# Patient Record
Sex: MALE | Race: WHITE | Employment: FULL TIME | URBAN - METROPOLITAN AREA
[De-identification: names, ages, dates, MRNs, and addresses within clinical notes are randomized per-mention and may not be internally consistent; named-entity substitution may affect disease eponyms.]

---

## 2019-06-28 ENCOUNTER — OFFICE VISIT (OUTPATIENT)
Dept: URGENT CARE | Age: 34
End: 2019-06-28

## 2019-06-28 VITALS
HEART RATE: 77 BPM | BODY MASS INDEX: 33.27 KG/M2 | WEIGHT: 219.5 LBS | TEMPERATURE: 98 F | DIASTOLIC BLOOD PRESSURE: 85 MMHG | HEIGHT: 68 IN | RESPIRATION RATE: 20 BRPM | SYSTOLIC BLOOD PRESSURE: 138 MMHG | OXYGEN SATURATION: 98 %

## 2019-06-28 DIAGNOSIS — S46.911A SHOULDER STRAIN, RIGHT, INITIAL ENCOUNTER: ICD-10-CM

## 2019-06-28 DIAGNOSIS — M25.511 ACUTE PAIN OF RIGHT SHOULDER: Primary | ICD-10-CM

## 2019-06-28 DIAGNOSIS — M19.011 OSTEOARTHRITIS OF RIGHT SHOULDER, UNSPECIFIED OSTEOARTHRITIS TYPE: ICD-10-CM

## 2019-06-28 NOTE — PATIENT INSTRUCTIONS
Rest and seek medical care for increased problems, any questions or concern including but not limited to the ones discussed with you here today. Strain or Sprain: After Your Visit to the Emergency Room  Your Care Instructions  You were seen in the emergency room because you stretched or tore a muscle or ligament. A strain means that you pulled or tore a muscle. A sprain means that you stretched or tore a ligament that holds bones together. The doctor may have put a brace, splint, or elastic bandage on your injured limb to keep it from moving. Or you may have been given crutches. Rest and other home care can help the muscle or ligament heal. Be sure to talk to your doctor if you continue to have problems. Even though you have been released from the emergency room, you still need to watch for any problems. The doctor carefully checked you. But sometimes problems can develop later. If you have new symptoms, or if your symptoms do not get better, return to the emergency room or call your doctor right away. A visit to the emergency room is only one step in your treatment. Even if you feel better, you still need to do what your doctor recommends, such as going to all suggested follow-up appointments and taking medicines exactly as directed. This will help you recover and help prevent future problems. How can you care for yourself at home? · Rest the strained or sprained arm or leg. Do not put weight on it for a day or two. If your doctor tells you to, use crutches or a sling to rest a sore limb. · Take pain medicines exactly as directed. ¨ If the doctor gave you a prescription medicine for pain, take it as prescribed. ¨ If you are not taking a prescription pain medicine, ask your doctor if you can take an over-the-counter medicine. · Put ice or a cold pack on the sore area for 10 to 20 minutes at a time. Try to do this every 1 to 2 hours for the next 3 days (when you are awake) or until the swelling goes down. Put a thin cloth between the ice and your skin. · Prop up the arm or leg on a pillow when you ice it or anytime you sit or lie down. Try to keep it above the level of your heart. This will help reduce swelling. · Do not do anything that makes the pain worse. When should you call for help? Return to the emergency room now if:  · You have new severe pain. · The injured area is numb, tingling, cool, pale, or changes color. · You cannot move the injured area. Call your doctor today if:  · You have pain and swelling that gets worse. · You are not getting better as expected. Where can you learn more? Go to [employer]  Enter C197 in the search box to learn more about \"Strain or Sprain: After Your Visit to the Emergency Room. \"   © 4450-2886 Healthwise, Incorporated. Care instructions adapted under license by [facility] (which disclaims liability or warranty for this information). This care instruction is for use with your licensed healthcare professional. If you have questions about a medical condition or this instruction, always ask your healthcare professional. [place] [address] any warranty or liability for your use of this information. Content Version: 9.4.92662; Last Revised: November 30, 2010               Shoulder Arthritis: Exercises  Your Care Instructions  Here are some examples of typical rehabilitation exercises for your condition. Start each exercise slowly. Ease off the exercise if you start to have pain. Your doctor or physical therapist will tell you when you can start these exercises and which ones will work best for you. How to do the exercises  Shoulder flexion (lying down)    1. Lie on your back, holding a wand with both hands. Your palms should face down as you hold the wand. 2. Keeping your elbows straight, slowly raise your arms over your head.  Raise them until you feel a stretch in your shoulders, upper back, and chest.  3. Hold for 15 to 30 seconds. 4. Repeat 2 to 4 times. Shoulder rotation (lying down)    1. Lie on your back. Hold a wand with both hands with your elbows bent and palms up. 2. Keep your elbows close to your body, and move the wand across your body toward the sore arm. 3. Hold for 8 to 12 seconds. 4. Repeat 2 to 4 times. Shoulder internal rotation with towel    1. Hold a towel above and behind your head with the arm that is not sore. 2. With your sore arm, reach behind your back and grasp the towel. 3. With the arm above your head, pull the towel upward. Do this until you feel a stretch on the front and outside of your sore shoulder. 4. Hold 15 to 30 seconds. 5. Repeat 2 to 4 times. Shoulder blade squeeze    1. Stand with your arms at your sides, and squeeze your shoulder blades together. Do not raise your shoulders up as you squeeze. 2. Hold 6 seconds. 3. Repeat 8 to 12 times. Resisted rows    1. Put the band around a solid object at about waist level. (A bedpost will work well.) Each hand should hold an end of the band. 2. With your elbows at your sides and bent to 90 degrees, pull the band back. Your shoulder blades should move toward each other. Return to the starting position. 3. Repeat 8 to 12 times. External rotator strengthening exercise    1. Start by tying a piece of elastic exercise material to a doorknob. You can use surgical tubing or Thera-Band. (You may also hold one end of the band in each hand.)  2. Stand or sit with your shoulder relaxed and your elbow bent 90 degrees. Your upper arm should rest comfortably against your side. Squeeze a rolled towel between your elbow and your body for comfort. This will help keep your arm at your side. 3. Hold one end of the elastic band with the hand of the painful arm. 4. Start with your forearm across your belly. Slowly rotate the forearm out away from your body.  Keep your elbow and upper arm tucked against the towel roll or the side of your body until you begin to feel tightness in your shoulder. Slowly move your arm back to where you started. 5. Repeat 8 to 12 times. Internal rotator strengthening exercise    1. Start by tying a piece of elastic exercise material to a doorknob. You can use surgical tubing or Thera-Band. 2. Stand or sit with your shoulder relaxed and your elbow bent 90 degrees. Your upper arm should rest comfortably against your side. Squeeze a rolled towel between your elbow and your body for comfort. This will help keep your arm at your side. 3. Hold one end of the elastic band in the hand of the painful arm. 4. Slowly rotate your forearm toward your body until it touches your belly. Slowly move it back to where you started. 5. Keep your elbow and upper arm firmly tucked against the towel roll or at your side. 6. Repeat 8 to 12 times. Pendulum swing    1. Hold on to a table or the back of a chair with your good arm. Then bend forward a little and let your sore arm hang straight down. This exercise does not use the arm muscles. Rather, use your legs and your hips to create movement that makes your arm swing freely. 2. Use the movement from your hips and legs to guide the slightly swinging arm back and forth like a pendulum (or elephant trunk). Then guide it in circles that start small (about the size of a dinner plate). Make the circles a bit larger each day, as your pain allows. 3. Do this exercise for 5 minutes, 5 to 7 times each day. 4. As you have less pain, try bending over a little farther to do this exercise. This will increase the amount of movement at your shoulder. Follow-up care is a key part of your treatment and safety. Be sure to make and go to all appointments, and call your doctor if you are having problems. It's also a good idea to know your test results and keep a list of the medicines you take. Where can you learn more? Go to http://nevin-onesimo.info/.   Enter H562 in the search box to learn more about \"Shoulder Arthritis: Exercises. \"  Current as of: September 20, 2018  Content Version: 11.9  © 9096-4951 [employer]. Care instructions adapted under license by [employer] (which disclaims liability or warranty for this information). If you have questions about a medical condition or this instruction, always ask your healthcare professional. [address] any warranty or liability for your use of this information.

## 2019-06-28 NOTE — PROGRESS NOTES
About 6 weeks of pain with ROM in his rt shoulder after lifting a railroad tie. No other complaints. No previous injuries. Rt hand dominant    The history is provided by the patient. Shoulder Pain   This is a new problem. The current episode started more than 1 week ago. The problem occurs constantly. The problem has not changed since onset. Pertinent negatives include no chest pain, no abdominal pain, no headaches and no shortness of breath. Exacerbated by: certain positions. The symptoms are relieved by rest. He has tried nothing for the symptoms. History reviewed. No pertinent past medical history. Past Surgical History:   Procedure Laterality Date    HX WISDOM TEETH EXTRACTION           Family History   Problem Relation Age of Onset    Cancer Mother     Diabetes Father     Cancer Father     Hypertension Father     Elevated Lipids Father         Social History     Socioeconomic History    Marital status: SINGLE     Spouse name: Not on file    Number of children: Not on file    Years of education: Not on file    Highest education level: Not on file   Occupational History    Not on file   Social Needs    Financial resource strain: Not on file    Food insecurity:     Worry: Not on file     Inability: Not on file    Transportation needs:     Medical: Not on file     Non-medical: Not on file   Tobacco Use    Smoking status: Never Smoker    Smokeless tobacco: Never Used   Substance and Sexual Activity    Alcohol use:  Yes     Alcohol/week: 1.8 oz     Types: 1 Glasses of wine, 1 Cans of beer, 1 Shots of liquor per week    Drug use: No    Sexual activity: Yes     Partners: Female   Lifestyle    Physical activity:     Days per week: Not on file     Minutes per session: Not on file    Stress: Not on file   Relationships    Social connections:     Talks on phone: Not on file     Gets together: Not on file     Attends Restoration service: Not on file     Active member of club or organization: Not on file     Attends meetings of clubs or organizations: Not on file     Relationship status: Not on file    Intimate partner violence:     Fear of current or ex partner: Not on file     Emotionally abused: Not on file     Physically abused: Not on file     Forced sexual activity: Not on file   Other Topics Concern    Not on file   Social History Narrative    Not on file                ALLERGIES: Patient has no known allergies. Review of Systems   Constitutional: Negative. Respiratory: Negative. Negative for shortness of breath. Cardiovascular: Negative for chest pain. Gastrointestinal: Negative for abdominal pain. Musculoskeletal: Positive for arthralgias (rt shoulder pain with ROM). Negative for joint swelling. Skin: Negative. Neurological: Negative for weakness, numbness and headaches. Vitals:    06/28/19 0819   BP: 138/85   Pulse: 77   Resp: 20   Temp: 98 °F (36.7 °C)   SpO2: 98%   Weight: 219 lb 8 oz (99.6 kg)   Height: 5' 8\" (1.727 m)       Physical Exam   Constitutional: He is oriented to person, place, and time. He appears well-developed and well-nourished. HENT:   Head: Normocephalic and atraumatic. Mouth/Throat: Oropharynx is clear and moist. No oropharyngeal exudate. Eyes: Conjunctivae and EOM are normal. Right eye exhibits no discharge. Left eye exhibits no discharge. No scleral icterus. Neck: Normal range of motion. Neck supple. No tracheal deviation present. No thyromegaly present. Cardiovascular: Normal rate, regular rhythm and normal heart sounds. No murmur heard. Pulmonary/Chest: Effort normal and breath sounds normal. No respiratory distress. Musculoskeletal: Normal range of motion. He exhibits tenderness. He exhibits no edema. Pain in the [facility] with extension and external rotation. No swelling No crepitance. FROM with some tenderness noted above. NVI distally No rashes    Lymphadenopathy:     He has no cervical adenopathy.    Neurological: He is alert and oriented to person, place, and time. No cranial nerve deficit. Coordination normal.   Skin: Skin is warm. No rash noted. No erythema. Psychiatric: He has a normal mood and affect. His behavior is normal. Judgment and thought content normal.   Nursing note and vitals reviewed. MDM    ICD-10-CM ICD-9-CM    1. Acute pain of right shoulder M25.511 719.41 XR SHOULDER RT AP/LAT MIN 2 V   2. Shoulder strain, right, initial encounter S46.911A 840.9    3. Osteoarthritis of right shoulder, unspecified osteoarthritis type M19.011 715.91      No orders of the defined types were placed in this encounter. The patients condition was discussed with the patient and they understand. The patient is to follow up with primary care doctor ,If signs and symptoms become worse the pt is to go to the ER. The patient is to take medications as prescribed.                Procedures

## 2019-08-29 ENCOUNTER — OFFICE VISIT (OUTPATIENT)
Dept: FAMILY MEDICINE CLINIC | Age: 34
End: 2019-08-29

## 2019-08-29 VITALS
HEART RATE: 74 BPM | OXYGEN SATURATION: 99 % | HEIGHT: 68 IN | DIASTOLIC BLOOD PRESSURE: 80 MMHG | SYSTOLIC BLOOD PRESSURE: 130 MMHG | WEIGHT: 214.4 LBS | BODY MASS INDEX: 32.49 KG/M2 | TEMPERATURE: 98.3 F | RESPIRATION RATE: 16 BRPM

## 2019-08-29 DIAGNOSIS — R06.83 SNORING: ICD-10-CM

## 2019-08-29 DIAGNOSIS — S46.811S: ICD-10-CM

## 2019-08-29 DIAGNOSIS — E78.1 HYPERTRIGLYCERIDEMIA: ICD-10-CM

## 2019-08-29 DIAGNOSIS — Z23 ENCOUNTER FOR IMMUNIZATION: ICD-10-CM

## 2019-08-29 DIAGNOSIS — E66.9 OBESITY (BMI 30-39.9): ICD-10-CM

## 2019-08-29 DIAGNOSIS — M54.50 LUMBAR BACK PAIN: ICD-10-CM

## 2019-08-29 DIAGNOSIS — Z00.00 ROUTINE GENERAL MEDICAL EXAMINATION AT A HEALTH CARE FACILITY: Primary | ICD-10-CM

## 2019-08-29 RX ORDER — FLUTICASONE PROPIONATE 50 MCG
2 SPRAY, SUSPENSION (ML) NASAL DAILY
Qty: 1 BOTTLE | Refills: 5 | Status: SHIPPED | OUTPATIENT
Start: 2019-08-29 | End: 2022-07-07

## 2019-08-29 NOTE — PATIENT INSTRUCTIONS
Well Visit, Ages 25 to 48: Care Instructions  Your Care Instructions    Physical exams can help you stay healthy. Your doctor has checked your overall health and may have suggested ways to take good care of yourself. He or she also may have recommended tests. At home, you can help prevent illness with healthy eating, regular exercise, and other steps. Follow-up care is a key part of your treatment and safety. Be sure to make and go to all appointments, and call your doctor if you are having problems. It's also a good idea to know your test results and keep a list of the medicines you take. How can you care for yourself at home? · Reach and stay at a healthy weight. This will lower your risk for many problems, such as obesity, diabetes, heart disease, and high blood pressure. · Get at least 30 minutes of physical activity on most days of the week. Walking is a good choice. You also may want to do other activities, such as running, swimming, cycling, or playing tennis or team sports. Discuss any changes in your exercise program with your doctor. · Do not smoke or allow others to smoke around you. If you need help quitting, talk to your doctor about stop-smoking programs and medicines. These can increase your chances of quitting for good. · Talk to your doctor about whether you have any risk factors for sexually transmitted infections (STIs). Having one sex partner (who does not have STIs and does not have sex with anyone else) is a good way to avoid these infections. · Use birth control if you do not want to have children at this time. Talk with your doctor about the choices available and what might be best for you. · Protect your skin from too much sun. When you're outdoors from 10 a.m. to 4 p.m., stay in the shade or cover up with clothing and a hat with a wide brim. Wear sunglasses that block UV rays. Even when it's cloudy, put broad-spectrum sunscreen (SPF 30 or higher) on any exposed skin.   · See a dentist one or two times a year for checkups and to have your teeth cleaned. · Wear a seat belt in the car. Follow your doctor's advice about when to have certain tests. These tests can spot problems early. For everyone  · Cholesterol. Have the fat (cholesterol) in your blood tested after age 21. Your doctor will tell you how often to have this done based on your age, family history, or other things that can increase your risk for heart disease. · Blood pressure. Have your blood pressure checked during a routine doctor visit. Your doctor will tell you how often to check your blood pressure based on your age, your blood pressure results, and other factors. · Vision. Talk with your doctor about how often to have a glaucoma test.  · Diabetes. Ask your doctor whether you should have tests for diabetes. · Colon cancer. Your risk for colorectal cancer gets higher as you get older. Some experts say that adults should start regular screening at age 48 and stop at age 76. Others say to start before age 48 or continue after age 76. Talk with your doctor about your risk and when to start and stop screening. For women  · Breast exam and mammogram. Talk to your doctor about when you should have a clinical breast exam and a mammogram. Medical experts differ on whether and how often women under 50 should have these tests. Your doctor can help you decide what is right for you. · Cervical cancer screening test and pelvic exam. Begin with a Pap test at age 24. The test often is part of a pelvic exam. Starting at age 27, you may choose to have a Pap test, an HPV test, or both tests at the same time (called co-testing). Talk with your doctor about how often to have testing. · Tests for sexually transmitted infections (STIs). Ask whether you should have tests for STIs. You may be at risk if you have sex with more than one person, especially if your partners do not wear condoms.   For men  · Tests for sexually transmitted infections (STIs). Ask whether you should have tests for STIs. You may be at risk if you have sex with more than one person, especially if you do not wear a condom. · Testicular cancer exam. Ask your doctor whether you should check your testicles regularly. · Prostate exam. Talk to your doctor about whether you should have a blood test (called a PSA test) for prostate cancer. Experts differ on whether and when men should have this test. Some experts suggest it if you are older than 39 and are -American or have a father or brother who got prostate cancer when he was younger than 72. When should you call for help? Watch closely for changes in your health, and be sure to contact your doctor if you have any problems or symptoms that concern you. Where can you learn more? Go to http://nevin-onesimo.info/. Enter P072 in the search box to learn more about \"Well Visit, Ages 25 to 48: Care Instructions. \"  Current as of: December 13, 2018  Content Version: 12.1  © 9932-2443 Healthwise, Incorporated. Care instructions adapted under license by [employer] (which disclaims liability or warranty for this information). If you have questions about a medical condition or this instruction, always ask your healthcare professional. [place] [address] any warranty or liability for your use of this information.

## 2019-08-29 NOTE — PROGRESS NOTES
Chief Complaint   Patient presents with    New Patient     to est. [facility] , no other pcp since seen here in 2016       Reviewed Record in preparation for visit and have obtained necessary documentation. Identified pt with two pt identifiers (Name @ )    Health Maintenance Due   Topic    DTaP/Tdap/Td series (1 - Tdap)    Influenza Age 5 to Adult          1. Have you been to the ER, urgent care clinic since your last visit? Hospitalized since your last visit? Went to urgent care in  for right shoulder pain. 2. Have you seen or consulted any other health care providers outside of the [address] [place] [address] since your last visit? Include any pap smears or colon screening.  No

## 2019-08-29 NOTE — PROGRESS NOTES
[facility] Note    Jamison Pendleton is a 37 y.o. male who was seen in clinic today (8/29/2019). Subjective:  Cardiovascular Review:  The patient has obesity and hypertriglyceridemia. Diet and Lifestyle: generally follows a low fat low cholesterol diet, generally follows a low sodium diet, sedentary, nonsmoker  Home BP Monitoring: is not measured at home. Pertinent ROS: no TIA's, no chest pain on exertion, no dyspnea on exertion, no swelling of ankles. Patient reports snoring and chronic congestion. Shoulder Pain  Patient complains of right side shoulder pain. The symptoms began 4 months ago Course of symptoms since onset has been symptoms have progressed to a point and plateaued. . Pain is described as overall severity = moderate and location: glenohumeral region. Symptoms were incited by work related injury. Therapy to date includes home exercises: somewhat effective. Back Pain  Patient presents for evaluation of low back problems. Symptoms have been present for several years and include pain in right lower back (aching in character; 6/10 in severity). Initial inciting event: none. Symptoms are worst: morning. Alleviating factors identifiable by patient are stretching. Exacerbating factors identifiable by patient are bending forwards. Treatments so far initiated by patient: home exercises. Previous lower back problems: none. Previous workup: none. Prior to Admission medications    Medication Sig Start Date End Date Taking? Authorizing Provider   fluticasone propionate (FLONASE) 50 mcg/actuation nasal spray 2 Sprays by Both Nostrils route daily. 8/29/19  Yes Hair Waters, NP          No Known Allergies        Review of Systems   Constitutional: Negative for malaise/fatigue and weight loss. HENT: Negative for hearing loss. Eyes: Negative for blurred vision. Respiratory: Negative for shortness of breath.     Cardiovascular: Negative for chest pain, palpitations and leg swelling. Gastrointestinal: Negative for abdominal pain, constipation, diarrhea and heartburn. Genitourinary: Negative for frequency and urgency. Musculoskeletal: Negative for back pain, joint pain and myalgias. Neurological: Negative for dizziness, weakness and headaches. Endo/Heme/Allergies: Does not bruise/bleed easily. Psychiatric/Behavioral: Negative for depression. Objective:   Physical Exam   Constitutional: He is oriented to person, place, and time. He appears well-developed and well-nourished. No distress. HENT:   Right Ear: Tympanic membrane and ear canal normal.   Left Ear: Tympanic membrane and ear canal normal.   Nose: No mucosal edema. Right sinus exhibits no maxillary sinus tenderness and no frontal sinus tenderness. Left sinus exhibits no maxillary sinus tenderness and no frontal sinus tenderness. Mouth/Throat: Oropharynx is clear and moist.   Eyes: Pupils are equal, round, and reactive to light. EOM are normal.   Neck: Normal range of motion. Neck supple. No JVD present. Carotid bruit is not present. No thyromegaly present. Cardiovascular: Normal rate, regular rhythm, normal heart sounds and intact distal pulses. Exam reveals no gallop and no friction rub. No murmur heard. Pulmonary/Chest: Effort normal and breath sounds normal. No respiratory distress. He has no decreased breath sounds. He has no wheezes. He has no rhonchi. Abdominal: Soft. Bowel sounds are normal. He exhibits no distension. There is no tenderness. Musculoskeletal: He exhibits no edema. Right shoulder: He exhibits tenderness (anterior deltoid) and decreased strength (external rotation). He exhibits normal range of motion and no bony tenderness. Lumbar back: He exhibits decreased range of motion (with flexion) and bony tenderness (right SI joint). He exhibits no tenderness and no spasm. Negative SLR bilaterally.  Leg strength equal bilaterally 5/5 with extension and flexion. Lymphadenopathy:     He has no cervical adenopathy. Neurological: He is alert and oriented to person, place, and time. Gait normal.   Psychiatric: He has a normal mood and affect. His behavior is normal.   Nursing note and vitals reviewed. Visit Vitals  /80 (BP 1 Location: Left arm, BP Patient Position: Sitting)   Pulse 74   Temp 98.3 °F (36.8 °C) (Oral)   Resp 16   Ht 5' 8\" (1.727 m)   Wt 214 lb 6.4 oz (97.3 kg)   SpO2 99%   BMI 32.60 kg/m²       Assessment & Plan:  Diagnoses and all orders for this visit:    1. Routine general medical examination at a health care facility  -     CBC W/O DIFF  -     METABOLIC PANEL, COMPREHENSIVE  -     LIPID PANEL    2. Lumbar back pain  Request physical therapy evaluation and treatment.   -     REFERRAL TO PHYSICAL THERAPY  -     XR SPINE LUMB 2 OR 3 V; Future    3. Obesity (BMI 30-39. 9)  Discussed need for weight loss through diet and exercise. Reviewed decreased caloric intake and increased activity. 4. Infraspinatus strain, right, sequela  Request physical therapy evaluation and treatment. Referral to orthopedics for continued symptoms.   -     REFERRAL TO PHYSICAL THERAPY    5. Hypertriglyceridemia  Reviewed diet and lifestyle changes. 6. Snoring  Reviewed weight loss. Flonase nasal spray daily for congestion. Referral to ENT for continued symptoms.   -     REFERRAL TO ENT-OTOLARYNGOLOGY  -     fluticasone propionate (FLONASE) 50 mcg/actuation nasal spray; 2 Sprays by Both Nostrils route daily. I have discussed the diagnosis with the patient and the intended plan as seen in the above orders. The patient has received an after-visit summary along with patient information handout. I have discussed medication side effects and warnings with the patient as well.             Ki Weir NP

## 2019-08-30 LAB
ALBUMIN SERPL-MCNC: 4.6 G/DL (ref 3.5–5.5)
ALBUMIN/GLOB SERPL: 1.6 {RATIO} (ref 1.2–2.2)
ALP SERPL-CCNC: 61 IU/L (ref 39–117)
ALT SERPL-CCNC: 32 IU/L (ref 0–44)
AST SERPL-CCNC: 30 IU/L (ref 0–40)
BILIRUB SERPL-MCNC: 0.5 MG/DL (ref 0–1.2)
BUN SERPL-MCNC: 15 MG/DL (ref 6–20)
BUN/CREAT SERPL: 15 (ref 9–20)
CALCIUM SERPL-MCNC: 9.5 MG/DL (ref 8.7–10.2)
CHLORIDE SERPL-SCNC: 100 MMOL/L (ref 96–106)
CHOLEST SERPL-MCNC: 162 MG/DL (ref 100–199)
CO2 SERPL-SCNC: 23 MMOL/L (ref 20–29)
CREAT SERPL-MCNC: 0.97 MG/DL (ref 0.76–1.27)
ERYTHROCYTE [DISTWIDTH] IN BLOOD BY AUTOMATED COUNT: 12.3 % (ref 12.3–15.4)
GLOBULIN SER CALC-MCNC: 2.8 G/DL (ref 1.5–4.5)
GLUCOSE SERPL-MCNC: 91 MG/DL (ref 65–99)
HCT VFR BLD AUTO: 47.2 % (ref 37.5–51)
HDLC SERPL-MCNC: 47 MG/DL
HGB BLD-MCNC: 16 G/DL (ref 13–17.7)
INTERPRETATION, 910389: NORMAL
LDLC SERPL CALC-MCNC: 95 MG/DL (ref 0–99)
MCH RBC QN AUTO: 30 PG (ref 26.6–33)
MCHC RBC AUTO-ENTMCNC: 33.9 G/DL (ref 31.5–35.7)
MCV RBC AUTO: 88 FL (ref 79–97)
PLATELET # BLD AUTO: 226 X10E3/UL (ref 150–450)
POTASSIUM SERPL-SCNC: 4.4 MMOL/L (ref 3.5–5.2)
PROT SERPL-MCNC: 7.4 G/DL (ref 6–8.5)
RBC # BLD AUTO: 5.34 X10E6/UL (ref 4.14–5.8)
SODIUM SERPL-SCNC: 138 MMOL/L (ref 134–144)
TRIGL SERPL-MCNC: 100 MG/DL (ref 0–149)
VLDLC SERPL CALC-MCNC: 20 MG/DL (ref 5–40)
WBC # BLD AUTO: 5 X10E3/UL (ref 3.4–10.8)

## 2020-08-28 ENCOUNTER — OFFICE VISIT (OUTPATIENT)
Dept: FAMILY MEDICINE CLINIC | Age: 35
End: 2020-08-28

## 2020-08-28 VITALS
BODY MASS INDEX: 32.31 KG/M2 | HEART RATE: 86 BPM | DIASTOLIC BLOOD PRESSURE: 89 MMHG | TEMPERATURE: 99 F | SYSTOLIC BLOOD PRESSURE: 127 MMHG | HEIGHT: 68 IN | OXYGEN SATURATION: 98 % | WEIGHT: 213.2 LBS | RESPIRATION RATE: 18 BRPM

## 2020-08-28 DIAGNOSIS — Z00.00 ROUTINE GENERAL MEDICAL EXAMINATION AT A HEALTH CARE FACILITY: Primary | ICD-10-CM

## 2020-08-28 DIAGNOSIS — L24.7 IRRITANT CONTACT DERMATITIS DUE TO PLANTS, EXCEPT FOOD: ICD-10-CM

## 2020-08-28 PROCEDURE — 99395 PREV VISIT EST AGE 18-39: CPT | Performed by: NURSE PRACTITIONER

## 2020-08-28 RX ORDER — TRIAMCINOLONE ACETONIDE 1 MG/G
CREAM TOPICAL 2 TIMES DAILY
Qty: 45 G | Refills: 1 | Status: SHIPPED | OUTPATIENT
Start: 2020-08-28 | End: 2022-07-07

## 2020-08-28 NOTE — PROGRESS NOTES
Chief Complaint   Patient presents with    Complete Physical    Poison Ivy/Poison Oak/Poison Sumac Exposure     1. Have you been to the ER, urgent care clinic since your last visit? Hospitalized since your last visit? No    2. Have you seen or consulted any other health care providers outside of the [address] [place] [address] since your last visit? Include any pap smears or colon screening. No       Patient presents in office for CPE. Would like to discuss anxiety. RUTH completed.

## 2020-08-29 LAB
ALBUMIN SERPL-MCNC: 4.9 G/DL (ref 4–5)
ALBUMIN/GLOB SERPL: 2 {RATIO} (ref 1.2–2.2)
ALP SERPL-CCNC: 59 IU/L (ref 39–117)
ALT SERPL-CCNC: 46 IU/L (ref 0–44)
AST SERPL-CCNC: 34 IU/L (ref 0–40)
BASOPHILS # BLD AUTO: 0.1 X10E3/UL (ref 0–0.2)
BASOPHILS NFR BLD AUTO: 1 %
BILIRUB SERPL-MCNC: 0.8 MG/DL (ref 0–1.2)
BUN SERPL-MCNC: 13 MG/DL (ref 6–20)
BUN/CREAT SERPL: 13 (ref 9–20)
CALCIUM SERPL-MCNC: 9.8 MG/DL (ref 8.7–10.2)
CHLORIDE SERPL-SCNC: 102 MMOL/L (ref 96–106)
CHOLEST SERPL-MCNC: 196 MG/DL (ref 100–199)
CO2 SERPL-SCNC: 23 MMOL/L (ref 20–29)
CREAT SERPL-MCNC: 1.03 MG/DL (ref 0.76–1.27)
EOSINOPHIL # BLD AUTO: 0.2 X10E3/UL (ref 0–0.4)
EOSINOPHIL NFR BLD AUTO: 3 %
ERYTHROCYTE [DISTWIDTH] IN BLOOD BY AUTOMATED COUNT: 12.9 % (ref 11.6–15.4)
GLOBULIN SER CALC-MCNC: 2.4 G/DL (ref 1.5–4.5)
GLUCOSE SERPL-MCNC: 104 MG/DL (ref 65–99)
HCT VFR BLD AUTO: 51.1 % (ref 37.5–51)
HDLC SERPL-MCNC: 51 MG/DL
HGB BLD-MCNC: 17.3 G/DL (ref 13–17.7)
IMM GRANULOCYTES # BLD AUTO: 0 X10E3/UL (ref 0–0.1)
IMM GRANULOCYTES NFR BLD AUTO: 1 %
INTERPRETATION, 910389: NORMAL
LDLC SERPL CALC-MCNC: 113 MG/DL (ref 0–99)
LYMPHOCYTES # BLD AUTO: 1.5 X10E3/UL (ref 0.7–3.1)
LYMPHOCYTES NFR BLD AUTO: 23 %
MCH RBC QN AUTO: 30 PG (ref 26.6–33)
MCHC RBC AUTO-ENTMCNC: 33.9 G/DL (ref 31.5–35.7)
MCV RBC AUTO: 89 FL (ref 79–97)
MONOCYTES # BLD AUTO: 0.5 X10E3/UL (ref 0.1–0.9)
MONOCYTES NFR BLD AUTO: 7 %
NEUTROPHILS # BLD AUTO: 4.2 X10E3/UL (ref 1.4–7)
NEUTROPHILS NFR BLD AUTO: 65 %
PLATELET # BLD AUTO: 230 X10E3/UL (ref 150–450)
POTASSIUM SERPL-SCNC: 4.8 MMOL/L (ref 3.5–5.2)
PROT SERPL-MCNC: 7.3 G/DL (ref 6–8.5)
RBC # BLD AUTO: 5.77 X10E6/UL (ref 4.14–5.8)
SODIUM SERPL-SCNC: 139 MMOL/L (ref 134–144)
TRIGL SERPL-MCNC: 162 MG/DL (ref 0–149)
VLDLC SERPL CALC-MCNC: 32 MG/DL (ref 5–40)
WBC # BLD AUTO: 6.4 X10E3/UL (ref 3.4–10.8)

## 2021-05-06 ENCOUNTER — OFFICE VISIT (OUTPATIENT)
Dept: FAMILY MEDICINE CLINIC | Age: 36
End: 2021-05-06
Payer: COMMERCIAL

## 2021-05-06 VITALS
TEMPERATURE: 97.8 F | HEIGHT: 68 IN | OXYGEN SATURATION: 99 % | HEART RATE: 73 BPM | RESPIRATION RATE: 16 BRPM | WEIGHT: 211.8 LBS | DIASTOLIC BLOOD PRESSURE: 94 MMHG | SYSTOLIC BLOOD PRESSURE: 135 MMHG | BODY MASS INDEX: 32.1 KG/M2

## 2021-05-06 DIAGNOSIS — K14.8 LESION OF TONGUE: Primary | ICD-10-CM

## 2021-05-06 PROCEDURE — 99213 OFFICE O/P EST LOW 20 MIN: CPT | Performed by: NURSE PRACTITIONER

## 2021-05-06 NOTE — PROGRESS NOTES
[facility] Note    Shaheen Ashraf is a 43 y.o. male who was seen in clinic today (5/6/2021). Subjective:  Skin Lesion   Patient complains of a tongue skin lesion. The patient reports that the lesion has been present for several months. The onset of the mass was gradual. Associated symptoms include enlargement. Patient denies pain, redness or discoloration of the skin, bleeding. Prior treatments, if any: none. Prior to Admission medications    Medication Sig Start Date End Date Taking? Authorizing Provider   triamcinolone acetonide (KENALOG) 0.1 % topical cream Apply  to affected area two (2) times a day. use thin layer 8/28/20   Marissa Spare, NP   fluticasone propionate (FLONASE) 50 mcg/actuation nasal spray 2 Sprays by Both Nostrils route daily. 8/29/19   Marissa Spare, NP          No Known Allergies        ROS  See HPI    Objective:   Physical Exam  Vitals signs and nursing note reviewed. Constitutional:       Appearance: He is well-developed. HENT:      Mouth/Throat:     Cardiovascular:      Rate and Rhythm: Normal rate and regular rhythm. Heart sounds: No murmur. No friction rub. No gallop. Pulmonary:      Effort: Pulmonary effort is normal. No respiratory distress. Breath sounds: Normal breath sounds. Neurological:      Mental Status: He is alert and oriented to person, place, and time. Psychiatric:         Speech: Speech normal.         Behavior: Behavior normal.         Thought Content: Thought content normal.           Visit Vitals  BP (!) 135/94 (BP 1 Location: Left upper arm, BP Patient Position: Sitting)   Pulse 73   Temp 97.8 °F (36.6 °C) (Temporal)   Resp 16   Ht 5' 8\" (1.727 m)   Wt 211 lb 12.8 oz (96.1 kg)   SpO2 99%   BMI 32.20 kg/m²       Assessment & Plan:  Diagnoses and all orders for this visit:    1. Lesion of tongue  Consider skin tag vs HPV.  Request oral surgeon evaluation.   -     REFERRAL TO ORAL MAXILLOFACIAL SURGERY      I have discussed the diagnosis with the patient and the intended plan as seen in the above orders. The patient has received an after-visit summary along with patient information handout. I have discussed medication side effects and warnings with the patient as well. Follow-up and Dispositions    · Return if symptoms worsen or fail to improve.            Jenelle Carrillo, CHARLIE

## 2021-05-06 NOTE — PROGRESS NOTES
Chief Complaint   Patient presents with    Mass     on tongue not painful      1. Have you been to the ER, urgent care clinic since your last visit? Hospitalized since your last visit? Yes When: patient first 3/8/21 anxiety causing nausea     2. Have you seen or consulted any other health care providers outside of the [address] [place] [address] since your last visit? Include any pap smears or colon screening.  No       Patient has had covid vaccine

## 2021-11-04 ENCOUNTER — OFFICE VISIT (OUTPATIENT)
Dept: FAMILY MEDICINE CLINIC | Age: 36
End: 2021-11-04
Payer: COMMERCIAL

## 2021-11-04 ENCOUNTER — TELEPHONE (OUTPATIENT)
Dept: FAMILY MEDICINE CLINIC | Age: 36
End: 2021-11-04

## 2021-11-04 VITALS
TEMPERATURE: 98 F | DIASTOLIC BLOOD PRESSURE: 84 MMHG | BODY MASS INDEX: 31.84 KG/M2 | SYSTOLIC BLOOD PRESSURE: 121 MMHG | WEIGHT: 215 LBS | HEIGHT: 69 IN

## 2021-11-04 DIAGNOSIS — R07.9 CHEST PAIN AT REST: Primary | ICD-10-CM

## 2021-11-04 DIAGNOSIS — Z23 NEEDS FLU SHOT: ICD-10-CM

## 2021-11-04 DIAGNOSIS — F41.9 ANXIETY: ICD-10-CM

## 2021-11-04 PROCEDURE — 93000 ELECTROCARDIOGRAM COMPLETE: CPT | Performed by: NURSE PRACTITIONER

## 2021-11-04 PROCEDURE — 99213 OFFICE O/P EST LOW 20 MIN: CPT | Performed by: NURSE PRACTITIONER

## 2021-11-04 PROCEDURE — 90686 IIV4 VACC NO PRSV 0.5 ML IM: CPT | Performed by: NURSE PRACTITIONER

## 2021-11-04 PROCEDURE — 90471 IMMUNIZATION ADMIN: CPT | Performed by: NURSE PRACTITIONER

## 2021-11-04 RX ORDER — HYDROXYZINE PAMOATE 25 MG/1
CAPSULE ORAL
Qty: 60 CAPSULE | Refills: 1 | Status: SHIPPED | OUTPATIENT
Start: 2021-11-04 | End: 2022-07-07

## 2021-11-04 NOTE — PROGRESS NOTES
[facility] Note     Nj Orozco (: 1985) is a 43 y.o. male, established patient, here for evaluation of the following chief complaint(s):  Agitation (Anxiety; unable to sleep; then spirals into trouble breathing, increased heart rate, lasting for about a year, but gotten worse in the last 2 months; talking with therapist; Interested in Anxiety meds; )       ASSESSMENT/PLAN:  1. Chest pain at rest  -     AMB POC EKG ROUTINE W/ 12 LEADS, INTER & REP  -Chest pain likely due to underlying anxiety as EKG was normal.  Should this persist or become worse would consider referral to cardiology for additional testing. 2. Anxiety  -Discussed counseling services  -Education on when to seek emergency care provided  -Discussed maintaining healthy diet, physical activity and regular sleep schedule. Discussed guided imagery and melatonin that may help with sleep. -Explored medications. Patient is agreeable to as needed hydroxyzine for periods of anxiety but wishes to defer on daily medications at this time. He is interested in exploring medical cannabis. 3. Needs flu shot  -     INFLUENZA VIRUS VAC QUAD,SPLIT,PRESV FREE SYRINGE IM      Return in about 1 month (around 2021), or if symptoms worsen or fail to improve, for Regular Follow-up. SUBJECTIVE/OBJECTIVE:    Nj Orozco is a 43 y.o. male seen to day for anxiety. Anxiety  Patient complains of anxiety panic attacks and sleep disturbance. He has the following symptoms: chest pain, paresthesias, insomnia and psychomotor agitation. Onset of symptoms was approximately May 2020.ago. 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 Since onset it has been progressively worse. He denies current suicidal and homicidal ideation. Counts include 234 beds at the [facility] Possible organic causes contributing are: none.  Risk factors: none apparent      He describes anxiety symptoms as the following: uncomfortable sensation in stomach, nervousness, headaches, hot flashes, chest discomfort and jaw numbness/tingling. Typically azul with watching movies,reading, music and video games. Now they do not help or hold his attention. Tried THC gummies at night which he reports helped him sleep through the night      Review of Systems   Constitutional: Negative. HENT: Negative. Eyes: Negative. Respiratory: Negative. Cardiovascular: Positive for chest pain (with anxiety). Negative for palpitations and leg swelling. Gastrointestinal: Negative. Genitourinary: Negative. Musculoskeletal: Negative. Skin: Negative. Neurological: Negative. Psychiatric/Behavioral: Positive for suicidal ideas. Negative for self-injury and sleep disturbance. The patient is nervous/anxious. General appearance - Alert, NAD.  male  Head: Atraumatic. Normocephalic. Eyes:  Sclera anicteric. Ears: Hearing grossly normal.    Respiratory - no increased WOB  Abdomen -  Non distended. Neurological - No focal deficits. Speech normal.   Musculoskeletal - Normal ROM, Gait normal.  .  Skin - normal coloration and normal turgor. No cyanosis, no rash. On this date 11/04/2021 I have spent 25 minutes reviewing previous notes, test results and face to face with the patient discussing the diagnosis and importance of compliance with the treatment plan as well as documenting on the day of the visit. An electronic signature was used to authenticate this note.   -- Gisela Darling NP

## 2021-11-04 NOTE — TELEPHONE ENCOUNTER
----- Message from [address] [place] [address] sent at 11/3/2021  1:50 PM EDT -----  Subject: Message to Provider    QUESTIONS  Information for Provider? Patient called in about his appt tomorrow and   wants to advise that when he expierences the panic attacks he notices   chest pains as well as numbness in his jaw. He said he hasn't had any   anxiety today but he wants to discuss this at appt tomorrow and requested   a msg be sent to provider. Best contact if any questions [phone]   messages okay   ---------------------------------------------------------------------------  --------------  CALL BACK INFO  What is the best way for the office to contact you? OK to leave message on   voicemail  Preferred Call Back Phone Number? [phone]  ---------------------------------------------------------------------------  --------------  SCRIPT ANSWERS  Relationship to Patient?  Self

## 2021-11-04 NOTE — PROGRESS NOTES
Renita Mccormick is a 43 y.o. male    Chief Complaint   Patient presents with    Agitation     Anxiety; unable to sleep; then spirals into trouble breathing, increased heart rate, lasting for about a year, but gotten worse in the last 2 months; talking with therapist; Interested in Anxiety meds;        1. Have you been to the ER, urgent care clinic since your last visit? Hospitalized since your last visit? No    2. Have you seen or consulted any other health care providers outside of the [address] [place] [address] since your last visit? Include any pap smears or colon screening. Oksana Estrada.     Visit Vitals  /84 (BP 1 Location: Left upper arm, BP Patient Position: Sitting)   Temp 98 °F (36.7 °C) (Temporal)   Ht 5' 9\" (1.753 m)   Wt 215 lb (97.5 kg)   BMI 31.75 kg/m²

## 2021-11-04 NOTE — PATIENT INSTRUCTIONS
Panic Attacks: Care Instructions Overview During a panic attack, you may have a feeling of intense fear or terror, trouble breathing, chest pain or tightness, heartbeat changes, dizziness, sweating, and shaking. A panic attack starts suddenly and usually lasts from 5 to 20 minutes but may last even longer. An attack can begin with a stressful event. Or it can happen without a cause. Although panic attacks can cause scary symptoms, you can learn to manage them with self-care, counseling, and medicine. Follow-up care is a key part of your treatment and safety. Be sure to make and go to all appointments, and call your doctor if you are having problems. It's also a good idea to know your test results and keep a list of the medicines you take. How can you care for yourself at home? · Take your medicine exactly as directed. Call your doctor if you think you are having a problem with your medicine. · Go to your counseling sessions and follow-up appointments. · Recognize and accept your anxiety. Then, when you are in a situation that makes you anxious, say to yourself, \"This is not an emergency. I feel uncomfortable, but I am not in danger. I can keep going even if I feel anxious. \" · Be kind to your body: 
? Relieve tension with exercise or a massage. ? Get enough rest. 
? Avoid alcohol, caffeine, nicotine, and illegal drugs. They can increase your anxiety level, cause sleep problems, or trigger a panic attack. ? Learn and do relaxation techniques. See below for more about these techniques. · Engage your mind. Get out and do something you enjoy. Go to a funny movie, or take a walk or hike. Plan your day. Having too much or too little to do can make you anxious. · Keep a record of your symptoms. Discuss your fears with a good friend or family member, or join a support group for people with similar problems. Talking to others sometimes relieves stress.  
· Get involved in social groups, or volunteer to help others. Being alone sometimes makes things seem worse than they are. · Get at least 30 minutes of exercise on most days of the week to relieve stress. Walking is a good choice. You also may want to do other activities, such as running, swimming, cycling, or playing tennis or team sports. Relaxation techniques Do relaxation exercises for 10 to 20 minutes a day. You can play soothing, relaxing music while you do them, if you wish. · Tell others in your house that you are going to do your relaxation exercises. Ask them not to disturb you. · Find a comfortable place, away from all distractions and noise. · Lie down on your back, or sit with your back straight. · Focus on your breathing. Make it slow and steady. · Breathe in through your nose. Breathe out through either your nose or mouth. · Breathe deeply, filling up the area between your navel and your rib cage. Breathe so that your belly goes up and down. · Do not hold your breath. · Breathe like this for 5 to 10 minutes. Notice the feeling of calmness throughout your whole body. As you continue to breathe slowly and deeply, relax by doing the following for another 5 to 10 minutes: · Tighten and relax each muscle group in your body. You can begin at your toes and work your way up to your head. · Imagine your muscle groups relaxing and becoming heavy. · Empty your mind of all thoughts. · Let yourself relax more and more deeply. · Become aware of the state of calmness that surrounds you. · When your relaxation time is over, you can bring yourself back to alertness by moving your fingers and toes and then your hands and feet and then stretching and moving your entire body. Sometimes people fall asleep during relaxation, but they usually wake up shortly afterward. · Always give yourself time to return to full alertness before you drive a car or do anything that might cause an accident if you are not fully alert.  Never play a relaxation tape while driving a car. When should you call for help? Call 911 anytime you think you may need emergency care. For example, call if: 
  · You feel you cannot stop from hurting yourself or someone else. Watch closely for changes in your health, and be sure to contact your doctor if: 
  · Your panic attacks get worse.  
  · You have new or different anxiety.  
  · You are not getting better as expected. Where can you learn more? Go to http://www.gray.com/ Enter H601 in the search box to learn more about \"Panic Attacks: Care Instructions. \" Current as of: June 16, 2021               Content Version: 13.0 © 3694-7714 [employer]wise, [employer]. Care instructions adapted under license by [employer] (which disclaims liability or warranty for this information). If you have questions about a medical condition or this instruction, always ask your healthcare professional. [place] [address] any warranty or liability for your use of this information.

## 2022-07-05 ENCOUNTER — TELEPHONE (OUTPATIENT)
Dept: FAMILY MEDICINE CLINIC | Age: 37
End: 2022-07-05

## 2022-07-05 NOTE — TELEPHONE ENCOUNTER
Attempted to call patient. Left voicemail that a blood type test was never done by us so we do not have that information.

## 2022-07-07 ENCOUNTER — OFFICE VISIT (OUTPATIENT)
Dept: FAMILY MEDICINE CLINIC | Age: 37
End: 2022-07-07
Payer: COMMERCIAL

## 2022-07-07 VITALS
TEMPERATURE: 98.4 F | DIASTOLIC BLOOD PRESSURE: 87 MMHG | HEIGHT: 69 IN | HEART RATE: 74 BPM | SYSTOLIC BLOOD PRESSURE: 129 MMHG | RESPIRATION RATE: 16 BRPM | OXYGEN SATURATION: 98 % | WEIGHT: 220.8 LBS | BODY MASS INDEX: 32.7 KG/M2

## 2022-07-07 DIAGNOSIS — Z11.59 NEED FOR HEPATITIS C SCREENING TEST: ICD-10-CM

## 2022-07-07 DIAGNOSIS — Z00.00 WELLNESS EXAMINATION: Primary | ICD-10-CM

## 2022-07-07 DIAGNOSIS — R07.89 OTHER CHEST PAIN: ICD-10-CM

## 2022-07-07 DIAGNOSIS — Z01.83 BLOOD TYPING ENCOUNTER: ICD-10-CM

## 2022-07-07 LAB
ABO + RH BLD: NORMAL
ALBUMIN SERPL-MCNC: 4 G/DL (ref 3.5–5)
ALBUMIN/GLOB SERPL: 1.2 {RATIO} (ref 1.1–2.2)
ALP SERPL-CCNC: 68 U/L (ref 45–117)
ALT SERPL-CCNC: 28 U/L (ref 12–78)
ANION GAP SERPL CALC-SCNC: 5 MMOL/L (ref 5–15)
AST SERPL-CCNC: 18 U/L (ref 15–37)
BASOPHILS # BLD: 0 K/UL (ref 0–0.1)
BASOPHILS NFR BLD: 1 % (ref 0–1)
BILIRUB SERPL-MCNC: 0.5 MG/DL (ref 0.2–1)
BLOOD BANK CMNT PATIENT-IMP: NORMAL
BLOOD GROUP ANTIBODIES SERPL: NORMAL
BUN SERPL-MCNC: 15 MG/DL (ref 6–20)
BUN/CREAT SERPL: 14 (ref 12–20)
CALCIUM SERPL-MCNC: 9.8 MG/DL (ref 8.5–10.1)
CHLORIDE SERPL-SCNC: 105 MMOL/L (ref 97–108)
CHOLEST SERPL-MCNC: 196 MG/DL
CO2 SERPL-SCNC: 29 MMOL/L (ref 21–32)
CREAT SERPL-MCNC: 1.04 MG/DL (ref 0.7–1.3)
DIFFERENTIAL METHOD BLD: ABNORMAL
EOSINOPHIL # BLD: 0.1 K/UL (ref 0–0.4)
EOSINOPHIL NFR BLD: 2 % (ref 0–7)
ERYTHROCYTE [DISTWIDTH] IN BLOOD BY AUTOMATED COUNT: 11.9 % (ref 11.5–14.5)
GLOBULIN SER CALC-MCNC: 3.3 G/DL (ref 2–4)
GLUCOSE SERPL-MCNC: 109 MG/DL (ref 65–100)
HCT VFR BLD AUTO: 50.6 % (ref 36.6–50.3)
HCV AB SERPL QL IA: NONREACTIVE
HDLC SERPL-MCNC: 52 MG/DL
HDLC SERPL: 3.8 {RATIO} (ref 0–5)
HGB BLD-MCNC: 17.2 G/DL (ref 12.1–17)
IMM GRANULOCYTES # BLD AUTO: 0 K/UL (ref 0–0.04)
IMM GRANULOCYTES NFR BLD AUTO: 1 % (ref 0–0.5)
LDLC SERPL CALC-MCNC: 107.4 MG/DL (ref 0–100)
LYMPHOCYTES # BLD: 1.5 K/UL (ref 0.8–3.5)
LYMPHOCYTES NFR BLD: 27 % (ref 12–49)
MCH RBC QN AUTO: 31.2 PG (ref 26–34)
MCHC RBC AUTO-ENTMCNC: 34 G/DL (ref 30–36.5)
MCV RBC AUTO: 91.8 FL (ref 80–99)
MONOCYTES # BLD: 0.5 K/UL (ref 0–1)
MONOCYTES NFR BLD: 8 % (ref 5–13)
NEUTS SEG # BLD: 3.3 K/UL (ref 1.8–8)
NEUTS SEG NFR BLD: 61 % (ref 32–75)
NRBC # BLD: 0 K/UL (ref 0–0.01)
NRBC BLD-RTO: 0 PER 100 WBC
PLATELET # BLD AUTO: 191 K/UL (ref 150–400)
PMV BLD AUTO: 11.5 FL (ref 8.9–12.9)
POTASSIUM SERPL-SCNC: 4.8 MMOL/L (ref 3.5–5.1)
PROT SERPL-MCNC: 7.3 G/DL (ref 6.4–8.2)
RBC # BLD AUTO: 5.51 M/UL (ref 4.1–5.7)
SODIUM SERPL-SCNC: 139 MMOL/L (ref 136–145)
SPECIMEN EXP DATE BLD: NORMAL
TRIGL SERPL-MCNC: 183 MG/DL (ref ?–150)
TSH SERPL DL<=0.05 MIU/L-ACNC: 0.92 UIU/ML (ref 0.36–3.74)
VLDLC SERPL CALC-MCNC: 36.6 MG/DL
WBC # BLD AUTO: 5.4 K/UL (ref 4.1–11.1)

## 2022-07-07 PROCEDURE — 99395 PREV VISIT EST AGE 18-39: CPT | Performed by: NURSE PRACTITIONER

## 2022-07-07 NOTE — PROGRESS NOTES
[facility] Note     Pam Chand (: 1985) is a 42 y.o. male, established patient, here for evaluation of the following chief complaint(s):  Physical       ASSESSMENT/PLAN:  1. Wellness examination  - Normal exam      - TYPE & SCREEN; Future  -     CBC WITH AUTOMATED DIFF; Future  -     METABOLIC PANEL, COMPREHENSIVE; Future  -     TSH 3RD GENERATION; Future  -     LIPID PANEL; Future  -     HEPATITIS C AB; Future    2. Need for hepatitis C screening test  -     HEPATITIS C AB; Future    3. Blood typing encounter  -     TYPE & SCREEN; Future  - Looking to establish [address] and if required to know blood type to complete his application. 4. Other chest pain  -     REFERRAL TO CARDIOLOGY  - ? Stress/anxiety component. Agreeable to discuss further and assess risks with Cardiology. No family of early CAD. Return in about 1 year (around 2023), or if symptoms worsen or fail to improve. SUBJECTIVE/OBJECTIVE:    Pam Chand is a 42 y.o. male seen today for wellness exam.    Looking to establish [facility] and if required to know blood type to complete his application. Cardiovascular Review:  He has no known cardiovascular conditions. Reports intermittent episodes of midsternal chest pain when at rest.  He is unable to attribute cause except for possible life stressors. He is able to maintain an active lifestyle without experiencing chest pain during his activities. Diet and Lifestyle: generally follows a low fat low cholesterol diet, exercises regularly, nonsmoker  Home BP Monitoring: is not measured at home. Pertinent ROS: no TIA's, no chest pain on exertion, no dyspnea on exertion, no swelling of ankles, no palpitations.        Occupation: Restaurantour  Exercise: weight training, cirtcuit training, boxing, water activities  Tobacco: No  ETOH: 5-6 a week  Hep C: not up to date  STI screening: Deferred           REVIEW OF SYSTEMS:    Review of Systems   Constitutional: Negative. HENT: Negative. Respiratory: Negative. Cardiovascular: Positive for chest pain (midsternal chest pain). Negative for palpitations and leg swelling. Gastrointestinal: Negative. Genitourinary: Negative. Musculoskeletal: Negative. Neurological: Negative. VITAL SIGNS:    Wt Readings from Last 3 Encounters:   07/07/22 220 lb 12.8 oz (100.2 kg)   11/04/21 215 lb (97.5 kg)   05/06/21 211 lb 12.8 oz (96.1 kg)     Temp Readings from Last 3 Encounters:   07/07/22 98.4 °F (36.9 °C) (Temporal)   11/04/21 98 °F (36.7 °C) (Temporal)   05/06/21 97.8 °F (36.6 °C) (Temporal)     BP Readings from Last 3 Encounters:   07/07/22 129/87   11/04/21 121/84   05/06/21 (!) 135/94     Pulse Readings from Last 3 Encounters:   07/07/22 74   05/06/21 73   08/28/20 86           PHYSICAL EXAMINATION:       General: Alert, cooperative, no distress  Eyes: Conjunctivae clear. Pupils equally round and reactive to light, Extraocular muscles intact. Ears: Normal external ear canals both ears. Nose: Nares normal. Septum midline. Mucosa normal. No drainage or sinus tenderness. Mouth/Throat: Lips, mucosa, and tongue normal. No oropharyngeal erythema. No tonsillar enlargement or exudate. Neck: Supple, symmetrical, trachea midline, no adenopathy. No thyroid enlargement/tenderness/nodules  Respiratory: Breathing comfortably, in no acute respiratory distress. Clear to auscultation bilaterally. Normal inspiratory and expiratory ratio. Cardiovascular: Regular rate and rhythm, S1, S2 normal, no murmur, click, rub or gallop. Extremities: no edema. Pulses 2+ and symmetric radial and dorsalis pedis   Abdomen: Soft, non-tender, not distended. Bowel sounds normal. No masses or organomegaly. MSK: Extremities normal appearing, atraumatic, no effusion. Gait steady and unassisted. Skin: Skin color, texture, turgor normal. No rashes or lesions on exposed skin.   Lymph nodes: Cervical, supraclavicular nodes normal.  Neurologic: Cranial nerves II-XII intact. Strength 5/5 grossly. Sensation and reflexes normal throughout. Psychiatric: Normal affect. Mood euthymic. Thoughts logical. Speech volume and speed normal            Treatment risks/benefits/costs/interactions/alternatives discussed with patient. Advised patient to call back or return to office if symptoms worsen/change/persist. If patient cannot reach us or should anything more severe/urgent arise he/she should proceed directly to the nearest emergency department. Discussed expected course/resolution/complications of diagnosis in detail with patient. Patient expressed understanding with the diagnosis and plan. An electronic signature was used to authenticate this note.   -- Chad Del Valle NP

## 2022-07-07 NOTE — PATIENT INSTRUCTIONS
Well Visit, Ages 25 to 48: Care Instructions  Overview     Well visits can help you stay healthy. Your doctor has checked your overall health and may have suggested ways to take good care of yourself. Your doctor also may have recommended tests. At home, you can help prevent illness with healthy eating, regular exercise, and other steps. Follow-up care is a key part of your treatment and safety. Be sure to make and go to all appointments, and call your doctor if you are having problems. It's also a good idea to know your test results and keep a list of the medicines you take. How can you care for yourself at home? · Get screening tests that you and your doctor decide on. Screening helps find diseases before any symptoms appear. · Eat healthy foods. Choose fruits, vegetables, whole grains, protein, and low-fat dairy foods. Limit fat, especially saturated fat. Reduce salt in your diet. · Limit alcohol. If you are a man, have no more than 2 drinks a day or 14 drinks a week. If you are a woman, have no more than 1 drink a day or 7 drinks a week. · Get at least 30 minutes of physical activity on most days of the week. Walking is a good choice. You also may want to do other activities, such as running, swimming, cycling, or playing tennis or team sports. Discuss any changes in your exercise program with your doctor. · Reach and stay at a healthy weight. This will lower your risk for many problems, such as obesity, diabetes, heart disease, and high blood pressure. · Do not smoke or allow others to smoke around you. If you need help quitting, talk to your doctor about stop-smoking programs and medicines. These can increase your chances of quitting for good. · Care for your mental health. It is easy to get weighed down by worry and stress. Learn strategies to manage stress, like deep breathing and mindfulness, and stay connected with your family and community.  If you find you often feel sad or hopeless, talk with your doctor. Treatment can help. · Talk to your doctor about whether you have any risk factors for sexually transmitted infections (STIs). You can help prevent STIs if you wait to have sex with a new partner (or partners) until you've each been tested for STIs. It also helps if you use condoms (male or female condoms) and if you limit your sex partners to one person who only has sex with you. Vaccines are available for some STIs, such as HPV. · Use birth control if it's important to you to prevent pregnancy. Talk with your doctor about the choices available and what might be best for you. · If you think you may have a problem with alcohol or drug use, talk to your doctor. This includes prescription medicines (such as amphetamines and opioids) and illegal drugs (such as cocaine and methamphetamine). Your doctor can help you figure out what type of treatment is best for you. · Protect your skin from too much sun. When you're outdoors from 10 a.m. to 4 p.m., stay in the shade or cover up with clothing and a hat with a wide brim. Wear sunglasses that block UV rays. Even when it's cloudy, put broad-spectrum sunscreen (SPF 30 or higher) on any exposed skin. · See a dentist one or two times a year for checkups and to have your teeth cleaned. · Wear a seat belt in the car. When should you call for help? Watch closely for changes in your health, and be sure to contact your doctor if you have any problems or symptoms that concern you. Where can you learn more? Go to http://www.Sendmebox.com/  Enter P072 in the search box to learn more about \"Well Visit, Ages 25 to 48: Care Instructions. \"  Current as of: October 6, 2021               Content Version: 13.2  © 3117-0932 [employer], [employer]. Care instructions adapted under license by Who@ (which disclaims liability or warranty for this information).  If you have questions about a medical condition or this instruction, always ask your healthcare professional. [place] [address] any warranty or liability for your use of this information.

## 2022-07-07 NOTE — PROGRESS NOTES
Chief Complaint   Patient presents with    Physical         1. \"Have you been to the ER, urgent care clinic since your last visit? Hospitalized since your last visit? \" No    2. \"Have you seen or consulted any other health care providers outside of the [address] [place] [address] since your last visit? \" No     3. For patients over 45: Has the patient had a colonoscopy?  NA - based on age       1 most recent PHQ Screens 7/7/2022   Little interest or pleasure in doing things Not at all   Feeling down, depressed, irritable, or hopeless Not at all   Total Score PHQ 2 0   Trouble falling or staying asleep, or sleeping too much -   Feeling tired or having little energy -   Poor appetite, weight loss, or overeating -   Feeling bad about yourself - or that you are a failure or have let yourself or your family down -   Trouble concentrating on things such as school, work, reading, or watching TV -   Moving or speaking so slowly that other people could have noticed; or the opposite being so fidgety that others notice -   Thoughts of being better off dead, or hurting yourself in some way -   PHQ 9 Score -   How difficult have these problems made it for you to do your work, take care of your home and get along with others -       Health Maintenance Due   Topic Date Due    Hepatitis C Screening  Never done    DTaP/Tdap/Td series (1 - Tdap) Never done    COVID-19 Vaccine (3 - Booster for [employer] series) 09/09/2021

## 2022-07-08 NOTE — PROGRESS NOTES
Please call the patient to inform him of his blood type which is A positive. Also triglycerides and LDL (bad cholesterol) are elevated. Triglycerides generally reflect high consumption one or more of the following: tobacco, alcohol, simple carbohydrates such as sugar or white flour, trans fats and saturated fats. Please work to increase aerobic (exercise) activity,  focus on attaining and maintaining a healthy weight and reduction of intake of simple carbohydrates, especially high-glycemic and high-fructose foods.

## 2022-07-14 ENCOUNTER — TELEPHONE (OUTPATIENT)
Dept: CARDIOLOGY CLINIC | Age: 37
End: 2022-07-14

## 2022-07-18 ENCOUNTER — OFFICE VISIT (OUTPATIENT)
Dept: FAMILY MEDICINE CLINIC | Age: 37
End: 2022-07-18
Payer: COMMERCIAL

## 2022-07-18 VITALS
BODY MASS INDEX: 32.61 KG/M2 | HEIGHT: 69 IN | OXYGEN SATURATION: 98 % | SYSTOLIC BLOOD PRESSURE: 123 MMHG | TEMPERATURE: 98.4 F | DIASTOLIC BLOOD PRESSURE: 80 MMHG | HEART RATE: 76 BPM | RESPIRATION RATE: 16 BRPM | WEIGHT: 220.2 LBS

## 2022-07-18 DIAGNOSIS — L81.9 PIGMENTED SKIN LESION OF UNCERTAIN NATURE: Primary | ICD-10-CM

## 2022-07-18 DIAGNOSIS — D22.9 BENIGN SKIN MOLE: ICD-10-CM

## 2022-07-18 PROCEDURE — 99213 OFFICE O/P EST LOW 20 MIN: CPT | Performed by: NURSE PRACTITIONER

## 2022-07-18 NOTE — PROGRESS NOTES
Chief Complaint   Patient presents with    Skin Problem     mole on right hand and mole on face that has changed size, feels different         1. \"Have you been to the ER, urgent care clinic since your last visit? Hospitalized since your last visit? \" No    2. \"Have you seen or consulted any other health care providers outside of the [address] [place] [address] since your last visit? \" No     3. For patients over 45: Has the patient had a colonoscopy?  NA - based on age       1 most recent PHQ Screens 7/7/2022   Little interest or pleasure in doing things Not at all   Feeling down, depressed, irritable, or hopeless Not at all   Total Score PHQ 2 0   Trouble falling or staying asleep, or sleeping too much -   Feeling tired or having little energy -   Poor appetite, weight loss, or overeating -   Feeling bad about yourself - or that you are a failure or have let yourself or your family down -   Trouble concentrating on things such as school, work, reading, or watching TV -   Moving or speaking so slowly that other people could have noticed; or the opposite being so fidgety that others notice -   Thoughts of being better off dead, or hurting yourself in some way -   PHQ 9 Score -   How difficult have these problems made it for you to do your work, take care of your home and get along with others -       Health Maintenance Due   Topic Date Due    DTaP/Tdap/Td series (1 - Tdap) Never done    COVID-19 Vaccine (3 - Booster for [employer] series) 09/09/2021

## 2022-07-18 NOTE — PROGRESS NOTES
[facility] Note     Lincoln Gloria (: 1985) is a 42 y.o. male, established patient, here for evaluation of the following chief complaint(s):  Skin Problem (mole on right hand and mole on face that has changed size, feels different)       ASSESSMENT/PLAN:  1. Pigmented skin lesion of uncertain nature  Comments:  right hand  -Lesion has been sent for approximately 1 day with sudden onset. This is very new. Advised patient to monitor at home for the next 1 to 2 weeks. Should there be significant change in size or fail to resolve would reevaluate with consideration towards biopsy.  -Recommended dermatology consultation for annual skin exams    2. Benign skin mole  Comments:  below bottom lip, right side  -Recommended dermatology consultation for annual skin exams        Return if symptoms worsen or fail to improve. SUBJECTIVE/OBJECTIVE:    Lincoln Gloria is a 42 y.o. male seen today for new skin lesion he noticed x 1 day ago. Noted he has been out on his boat recently. Additionally he has had a mole to the arm on his lip, right side which has been there for some time. Notes that he developed a zit that was large and painful but has subsequently resolved. Since this he feels that the mole itself has become bigger. Mr. Brothers Massed to call at least 2 dermatology practices and the soonest he could be seen would be in November. Denies fever. Denies known injury to his hand. Denies known burns. REVIEW OF SYSTEMS:    Review of Systems   Skin:        New hand lesion x 1 day, enlarging mole to right lower lip region   All other systems reviewed and are negative.         VITAL SIGNS:    Wt Readings from Last 3 Encounters:   22 220 lb 3.2 oz (99.9 kg)   22 220 lb 12.8 oz (100.2 kg)   21 215 lb (97.5 kg)     Temp Readings from Last 3 Encounters:   22 98.4 °F (36.9 °C) (Temporal)   22 98.4 °F (36.9 °C) (Temporal)   21 98 °F (36.7 °C) (Temporal)     BP Readings from Last 3 Encounters:   07/18/22 123/80   07/07/22 129/87   11/04/21 121/84     Pulse Readings from Last 3 Encounters:   07/18/22 76   07/07/22 74   05/06/21 73           PHYSICAL EXAMINATION:       General: Alert, cooperative, no distress  Respiratory: Breathing comfortably, in no acute respiratory distress. Clear to auscultation bilaterally. Cardiovascular: Regular rate and rhythm, S1, S2 normal, no murmur, click, rub or gallop. Extremities: no edema. Abdomen: Soft, non-tender, not distended. Bowel sounds normal. No masses or organomegaly. MSK: Extremities normal appearing, atraumatic, no effusion. Gait steady and unassisted. Skin: Approximately 2 mm raised lesion to the right hand that is a slight purple tinge & nontender. raised mole to right of lower lip partially visualized due to to facial hair, skin color, texture, turgor normal. No rashes or lesions on exposed skin. Neurologic: A/Ox3  Psychiatric: Normal affect. Mood euthymic. Thoughts logical. Speech volume and speed normal                        Treatment risks/benefits/costs/interactions/alternatives discussed with patient. Advised patient to call back or return to office if symptoms worsen/change/persist. If patient cannot reach us or should anything more severe/urgent arise he/she should proceed directly to the nearest emergency department. Discussed expected course/resolution/complications of diagnosis in detail with patient. Patient expressed understanding with the diagnosis and plan. An electronic signature was used to authenticate this note.   -- Ewa Salazar NP